# Patient Record
(demographics unavailable — no encounter records)

---

## 2025-01-24 NOTE — PLAN
[FreeTextEntry1] : Meenakshi BERTRAND PA-C, am scribing for and the presence of Dr.Robert Marshall the following sections HISTORY OF PRESENT ILLNESSS, PAST MEDICAL/FAMILY/SOCIAL HISTORY; REVIEW OF SYSTEMS; VITAL SIGNS; PHYSICAL EXAM; DISPOSITION.

## 2025-01-24 NOTE — ASSESSMENT
[FreeTextEntry1] : 32 y/o female with asymptomatic umbilical hernia incidentally noted on MRI. Discussed option of surgical repair, however as she is asymptomatic it reasonable to hold off on surgery. Discussed calling office if she develops any new/worsening symptoms. All questions answered. Notably greater than 50% of today's visit was spent on counseling and coordination of patients care.   Plan: RTC prn

## 2025-01-24 NOTE — DATA REVIEWED
[FreeTextEntry1] : Date of Exam: 12-   MRI PELVIS WITHOUT AND WITH CONTRAST  HISTORY:  Fibroids  FINDINGS: UTERUS: The uterus is anteverted. The uterus measures 11.3 x 8.4 x 8.7 cm cm in AP, transverse, and craniocaudal dimension. There is a large fibroid anteriorly measuring up to 9.0 cm. This is likely a pedunculated subserosal fibroid and extending anteriorly from the mid body.  ENDOMETRIUM: The endometrial stripe measures 0.3 cm.  The endometrial cavity is grossly normal.  JUNCTIONAL ZONE: The junctional zone is thickened. The anterior junctional zone measures 1.2 cm. The posterior junctional zone measures 1.2 cm.  VAGINA: Unremarkable.  RIGHT OVARY: The right ovary measures 3.2 x 2.3 x 4.3 cm. There are follicles in the right ovary measuring 6 mm.  LEFT OVARY: The left ovary measures 5.4 x 3.0 x 4.9 cm. There is a left ovarian cyst measuring 4.9 cm. There is no nodular enhancement  PERITONEUM: Unremarkable.  URINARY BLADDER: Unremarkable.  LYMPH NODES: There is no adenopathy.  VESSELS: Unremarkable.  BOWEL: Unremarkable.  SOFT TISSUES: There is mild free fluid in the pelvis. There is a small umbilical fat-containing hernia. There is soft tissue thickening posterior to the uterus and abutting the rectum on image 33 series 3. There is nodular thickening of the left round ligament on image 29 of series 3. These findings could reflect mild deep pelvic endometriosis.  BONES: Unremarkable.  IMPRESSION: Large pedunculated fibroid extending from the mid body. This measures up to 9 cm  Adenomyosis  Left ovarian cyst which is simple appearing without evidence of enhancement this measures up to 4.9 cm  Mild free fluid in the pelvis.  Low T2 signal thickening of the left round ligament and posterior to the uterus abutting the rectum which can reflect the pelvic endometriosis.

## 2025-01-24 NOTE — HISTORY OF PRESENT ILLNESS
[de-identified] : This is a 32 y/o female presenting to the office for evaluation and management of a umbilical hernia. She reports she was incidentally noted to have a umbilici hernia when she underwent a MRI of the pelvis for follow up in regard to her fibroids. She reports she does not feel a bulge in the area. She does not have any pain or discomfort. She denies any urinary or obstructive issues.

## 2025-01-24 NOTE — PHYSICAL EXAM
[Oriented to Person] : oriented to person [Oriented to Place] : oriented to place [Oriented to Time] : oriented to time [Calm] : calm [Abdominal Masses] : No abdominal masses [Abdomen Tenderness] : ~T ~M No abdominal tenderness [Tender] : nontender [de-identified] : NAD, comfortable [de-identified] : Normocephalic, atraumatic. No scleral icterus.  [de-identified] : Supple, no JVD or cervical lymphadenopathy.  [de-identified] : No respiratory distress  [de-identified] : +BS soft, nontender, nondistended. Small umbilical hernia, reducible, nontender.